# Patient Record
Sex: MALE | Race: WHITE
[De-identification: names, ages, dates, MRNs, and addresses within clinical notes are randomized per-mention and may not be internally consistent; named-entity substitution may affect disease eponyms.]

---

## 2023-02-05 ENCOUNTER — HOSPITAL ENCOUNTER (EMERGENCY)
Dept: HOSPITAL 26 - MED | Age: 23
Discharge: HOME | End: 2023-02-05
Payer: SELF-PAY

## 2023-02-05 VITALS — WEIGHT: 203.5 LBS | BODY MASS INDEX: 29.13 KG/M2 | HEIGHT: 70 IN

## 2023-02-05 VITALS — SYSTOLIC BLOOD PRESSURE: 137 MMHG | DIASTOLIC BLOOD PRESSURE: 75 MMHG

## 2023-02-05 VITALS — DIASTOLIC BLOOD PRESSURE: 75 MMHG | SYSTOLIC BLOOD PRESSURE: 137 MMHG

## 2023-02-05 DIAGNOSIS — R50.9: ICD-10-CM

## 2023-02-05 DIAGNOSIS — R07.9: ICD-10-CM

## 2023-02-05 DIAGNOSIS — B34.9: Primary | ICD-10-CM

## 2023-02-05 DIAGNOSIS — Z20.822: ICD-10-CM

## 2023-02-05 DIAGNOSIS — F17.210: ICD-10-CM

## 2023-02-05 DIAGNOSIS — R11.2: ICD-10-CM

## 2023-02-05 NOTE — NUR
C/O FEVER, CHILL, COUGH, N/V/D . HA, RUNNY NOSE X 4 DAYS AND 6/10 LEFT CHEST 
PAIN RADIATING TO LEFT ARM X TODAY. COVID TESTED  NEGATIVE 3 DAYS AGO.

## 2023-02-05 NOTE — NUR
-------------------------------------------------------------------------------

            *** Note undone in [facility] - 02/05/23 at 1854 by [facility] ***            

-------------------------------------------------------------------------------

PATIENT LEFT WITHOUT BEING SEEN BY DR. ABEBE. NO FURTHER CARE PROVIDED FOR 
PATIENT.

## 2023-02-05 NOTE — NUR
-------------------------------------------------------------------------------

            *** Note undone in [facility] - 02/05/23 at 1907 by SONYA ***            

-------------------------------------------------------------------------------

LEFT WITHOUT ERICK PAPERWORK